# Patient Record
Sex: MALE | Race: WHITE | NOT HISPANIC OR LATINO | Employment: FULL TIME | URBAN - METROPOLITAN AREA
[De-identification: names, ages, dates, MRNs, and addresses within clinical notes are randomized per-mention and may not be internally consistent; named-entity substitution may affect disease eponyms.]

---

## 2019-04-24 ENCOUNTER — OFFICE VISIT (OUTPATIENT)
Dept: UROLOGY | Facility: CLINIC | Age: 54
End: 2019-04-24
Payer: COMMERCIAL

## 2019-04-24 VITALS
WEIGHT: 140 LBS | TEMPERATURE: 99 F | DIASTOLIC BLOOD PRESSURE: 73 MMHG | RESPIRATION RATE: 18 BRPM | HEART RATE: 69 BPM | BODY MASS INDEX: 22.5 KG/M2 | HEIGHT: 66 IN | SYSTOLIC BLOOD PRESSURE: 115 MMHG

## 2019-04-24 DIAGNOSIS — N42.89 ASYMMETRIC PROSTATE: Primary | ICD-10-CM

## 2019-04-24 DIAGNOSIS — R39.11 BENIGN PROSTATIC HYPERPLASIA WITH URINARY HESITANCY: ICD-10-CM

## 2019-04-24 DIAGNOSIS — N40.1 BENIGN PROSTATIC HYPERPLASIA WITH URINARY HESITANCY: ICD-10-CM

## 2019-04-24 DIAGNOSIS — N52.9 ERECTILE DYSFUNCTION, UNSPECIFIED ERECTILE DYSFUNCTION TYPE: ICD-10-CM

## 2019-04-24 LAB
BILIRUB SERPL-MCNC: ABNORMAL MG/DL
BLOOD URINE, POC: ABNORMAL
COLOR, POC UA: YELLOW
GLUCOSE UR QL STRIP: ABNORMAL
KETONES UR QL STRIP: ABNORMAL
LEUKOCYTE ESTERASE URINE, POC: ABNORMAL
NITRITE, POC UA: ABNORMAL
PH, POC UA: 6
POC RESIDUAL URINE VOLUME: 21 ML (ref 0–100)
PROTEIN, POC: 30
SPECIFIC GRAVITY, POC UA: 1.02
UROBILINOGEN, POC UA: 0.2

## 2019-04-24 PROCEDURE — 99999 PR PBB SHADOW E&M-NEW PATIENT-LVL IV: CPT | Mod: PBBFAC,,, | Performed by: NURSE PRACTITIONER

## 2019-04-24 PROCEDURE — 99999 PR PBB SHADOW E&M-NEW PATIENT-LVL IV: ICD-10-PCS | Mod: PBBFAC,,, | Performed by: NURSE PRACTITIONER

## 2019-04-24 PROCEDURE — 51798 US URINE CAPACITY MEASURE: CPT | Mod: S$GLB,,, | Performed by: NURSE PRACTITIONER

## 2019-04-24 PROCEDURE — 81002 URINALYSIS NONAUTO W/O SCOPE: CPT | Mod: S$GLB,,, | Performed by: NURSE PRACTITIONER

## 2019-04-24 PROCEDURE — 99204 OFFICE O/P NEW MOD 45 MIN: CPT | Mod: 25,S$GLB,, | Performed by: NURSE PRACTITIONER

## 2019-04-24 PROCEDURE — 99204 PR OFFICE/OUTPT VISIT, NEW, LEVL IV, 45-59 MIN: ICD-10-PCS | Mod: 25,S$GLB,, | Performed by: NURSE PRACTITIONER

## 2019-04-24 PROCEDURE — 51798 PR MEAS,POST-VOID RES,US,NON-IMAGING: ICD-10-PCS | Mod: S$GLB,,, | Performed by: NURSE PRACTITIONER

## 2019-04-24 PROCEDURE — 81002 POCT URINE DIPSTICK WITHOUT MICROSCOPE: ICD-10-PCS | Mod: S$GLB,,, | Performed by: NURSE PRACTITIONER

## 2019-04-24 RX ORDER — ALFUZOSIN HYDROCHLORIDE 10 MG/1
10 TABLET, EXTENDED RELEASE ORAL
Qty: 30 TABLET | Refills: 12 | Status: SHIPPED | OUTPATIENT
Start: 2019-04-24 | End: 2021-05-03

## 2019-04-24 NOTE — PROGRESS NOTES
"[facility] Note  Staff: SUJATHA HartC    PCP: Jay Dubon    Chief Complaint: Prostate asymmetry, Urinary hesitancy.     Subjective:        HPI: Marcelo Broderick is a 53 y.o. male NEW PT presents with c/o urinary hesitancy, frequency, and straining x one month.  He is here today for further evaluation of symptoms.     History:  Pt has seen Dr.Stephen Rosa in the past ([id number]) for an finding of "asymmetric prostate" which was visualized pt states from GI MD who was doing his colonoscopy at that time and referred him to  for further evaluation.  No procedures were performed in regards to this as stated by pt in office today.    Questions asked the pt during ov today:  Urgency: Sometimes, urge incontinence? None  NTF: 1x night, DTF: None  Dysuria: No  Gross Hematuria:No  Straining:Yes, Hesistancy:Yes, Intermittency:Yes, Weak stream:Yes  Vasectomy: , no complications with that procedure done in [place], [place]  No recent PSA lab work has been performed.    AUA SS Today: 15 with QOL at  "Pleased"  Feeling of ICBE: 1  Frequency:3  Intermittency:4  Urgency:1  Weak urine stream:4  Strainin  Nocturia:1    REVIEW OF SYSTEMS:  A comprehensive 10 system review was performed and is negative except as noted above in HPI    PMHx:  Past Medical History:   Diagnosis Date    Prostate asymmetry      Kidney stones: No  Cataracts? None, wears glasses    PSHx:  Past Surgical History:   Procedure Laterality Date    KNEE ARTHROSCOPY W/ ACL RECONSTRUCTION Right     VASECTOMY       Screening Studies  Colonoscopy: Done in , normal findings.    Fam Hx:   malignancies: No    kidney stones: Yes - Father with hx of stones     Soc Hx:  +tobacco use;1pk per day; Pt began when he was 18 years old  +Alcohol, 4-16 oz of beer daily.  Occupation:    Allergies:  Pcn [penicillins]    Medications: reviewed   Anticoagulation: No    Objective:     Vitals:    19 1505   BP: 115/73 "   Pulse: 69   Resp: 18   Temp: 98.6 °F (37 °C)     General:WDWN in NAD  Eyes: PERRLA, normal conjunctiva  Respiratory: no increased work on breathing, clear to auscultation  Cardiovascular: regular rate and rhythm. No obvious extremity edema.  GI: palpation of masses. No tenderness. No hepatosplenomegaly to palpation.  Musculoskeletal: normal range of motion of bilateral upper extremities. Normal muscle strength and tone.  Skin: no obvious rashes or lesions. No tightening of skin noted.  Neurologic: CN grossly normal. Normal sensation.   Psychiatric: awake, alert and oriented x 3. Mood and affect normal. Cooperative.    :  Inspection of anus and perineum normal  No scrotal rashes, cysts or lesions  Epididymis normal in size, no tenderness  Testes normal and size, equal size bilaterally, no masses  Urethral meatus normal without discharge  Penis is circumcised,    ERICA: +Enlarged and asymmetric prostate gland without masses, tenderness. SV not palpable. Normal sphincter tone. No hemhorroids.  No bilateral inguinal hernias noted     PVR by bladder scan performed by me today:  21 mL    LABS REVIEW:  UA today:  Color:Clear, Yellow  Spec. Grav.  1.020  PH  6.5  30 Protein  Trace of Ketones  Cr:   Lab Results   Component Value Date    CREATININE 1.01 11/16/2016     Assessment:       1. Asymmetric prostate    2. Benign prostatic hyperplasia with urinary hesitancy    3. Erectile dysfunction, unspecified erectile dysfunction type          Plan:   BPH with LUTS, Asymmetric prostate, ED:    1.  We will obtain records from Dr. Sang Rosa's office to review old  records/treatments.  2.  Uroxatral 10 mg one tablet daily to be prescribed to pt in office today for current LUTS.  3.  The following lab work will be performed at [facility]--orders given to pt today.                  CBC, BMP, PSA-Total, Testosterone, Hgb A1C, FSH, LH, Prolactin, TSH    F/u After we receive lab results then we will contact pt to schedule f/up  appt.    [facility]: N/A    Christal Kaur, SUJATHAC

## 2019-04-24 NOTE — PATIENT INSTRUCTIONS
Benign Prostatic Hyperplasia    The prostate is a small gland that makes semen. As you age, the prostate grows. If it becomes too big, it may cause problems with urination. This condition is called benign prostatic hyperplasia (BPH).  Symptoms of BPH  BPH is common in men over age 60. Thats because the prostate grows bigger during a mans life. As it grows, it presses against the urethra. The urethra carries urine out of your body from your bladder through your penis. Your bladder may also weaken as you age. It may not empty completely after you urinate.  Men with BPH may have these symptoms:  · The urge to frequently urinate, especially at night  · Leaking or dribbling of urine  · A weak stream of urine  · Not able to urinate, or having trouble starting to urinate  Diagnosing BPH  BPH can hurt your bladder and kidneys. It can also lead to bladder stones and urinary tract infections. If you think you may have BPH, talk with your healthcare provider. Early treatment can prevent problems.  Several tests can diagnose BPH. These include:  · Digital rectal exam. During this procedure, your provider puts a gloved, greased (lubricated) finger into your rectum to check the size of your prostate.  · Imaging tests. X-rays and other imaging tests can find problems in your kidneys or bladder.  · Cystoscopy. This test uses a flexible tube with a camera (scope) to look inside your urinary tract.  · Urine flow study. This test uses a special device to see how fast urine leaves your body.  Treating BPH  If you have mild symptoms, you may not need treatment. You may be able to control your BPH with lifestyle changes. Some men feel better if they limit or avoid alcohol and caffeinated drinks like coffee. Not drinking too many fluids at night can also help. Increasing your physical activity may ease symptoms, too.  Kegel exercises may also help. They strengthen the pelvic muscle to prevent urine from leaking. While urinating,  contract your pelvic muscle to stop or slow down the flow of urine. Hold for 10 seconds. Repeat at least 5 times. Do the exercise 3 to 5 times each day.  Certain medicines can worsen BPH symptoms. These include medicines for congestion, allergies, and depression. Medicines that increase your urine flow (diuretics) can also worsen BPH symptoms. If you take any of these, talk with your provider. You may need to take another medicine or change how much you take.  BPH symptoms often get worse as the prostate grows. So at some point you may need treatment. Your provider may prescribe medicine to shrink the prostate or stop its growth. Other treatments can make the urethra wider to let urine to flow more easily. There are also some minimally invasive techniques to remove prostate tissue.  If your BPH is severe, your health care provider may recommend surgery. Surgery takes out enlarged parts of the prostate gland. Your health care provider can figure out the best option for you based on your age, overall health, and other factors.  BPH and Prostate Cancer  BPH and prostate cancer share some symptoms. Thats why its important to talk with your provider about your symptoms. Men with BPH arent more likely to develop this cancer. But they may have higher levels of the prostate-specific antigen (PSA). A higher PSA level may also be a sign of prostate cancer. Certain tests help distinguish BPH from prostate cancer. They include prostate ultrasound and biopsy.  Date Last Reviewed: 5/31/2015 © 2000-2017 [employer]. [address] [place] [address]. All rights reserved. This information is not intended as a substitute for professional medical care. Always follow your healthcare professional's instructions.        BPH (Enlarged Prostate)  The prostate is a gland at the base of the bladder. As some men get older, the prostate may get bigger in size. This problem is called benign prostatic hyperplasia  (BPH). BPH puts pressure on the urethra. This is the tube that carries urine from the bladder to the penis. It may interfere with the flow of urine. It may also keep the bladder from emptying fully.    Symptoms of BPH include trouble starting urination and feeling as though the bladder isnt emptying all the way. It also includes a weak urine stream, dribbling and leaking of urine, and frequent and urgent urination (especially at night). BPH can increase the risk of urinary infections. It can also block off urine flow completely. If this occurs, a thin tube (catheter) may be passed into the bladder to help drain urine.  If symptoms are mild, no treatment may be needed right now. If symptoms are more severe, treatment is likely needed. The goal of treatment is to improve urine flow and reduce symptoms. Treatments can include medicine and procedures. Your healthcare provider will discuss treatment options with you as needed.  Home care  The following guidelines will help you care for yourself at home:  · Urinate as soon as you feel the urge. Don't try to hold your urine.  · Don't limit your fluid intake during the day. Drink 6 to 8 glasses of water or liquids a day. This prevents bacteria from building up in the bladder.  · Avoid drinking fluids after dinner to help reduce urination during the night.  · Avoid medicines that can worsen your symptoms. These include certain cold and allergy medicines and antidepressants. Diuretics used for high blood pressure can also worsen symptoms. Talk to your doctor about the medicines you take. Other choices may work better for you.  Prostate cancer screening  BPH does not increase the risk of prostate cancer. But because prostate cancer is a common cancer in men, screening is sometimes recommended. This may help detect the cancer in its early stages when treatment is most effective. Factors that can increase the risk of prostate cancer include being -American or having a  father or brother who had prostate cancer. A high-fat diet may also increase the risk of prostate cancer. Talk to your healthcare provider to see whether you should be screened for prostate cancer.  Follow-up care  Follow up with your healthcare provider, or as advised  To learn more, go to:  · National Kidney & Urologic Diseases Information Clearinghouse  kidney.niddk.nih.gov, 431.265.8653  When to seek medical advice  Call your healthcare provider right away if any of these occur:  · Fever of 100.4°F (38.0°C) or higher, or as advised  · Unable to pass urine for 8 hours  · Increasing pressure or pain in your bladder (lower abdomen)  · Blood in the urine  · Increasing low back pain, not related to injury  · Symptoms of urinary infection (increased urge to urinate, burning when passing urine, foul-smelling urine)  Date Last Reviewed: 7/1/2016  © 8330-4499 [employer]. [address] [place] [address]. All rights reserved. This information is not intended as a substitute for professional medical care. Always follow your healthcare professional's instructions.        Prostate Problems and Related Urinary Symptoms    Many men have problems with the prostate at some time in their lives. The prostate gland is part of the male reproductive system. Its located just below the bladder. The prostate surrounds the urethra (the tube that carries urine and semen out of the body). The main function of the prostate gland is to add fluid to the semen. When problems occur in the prostate, the bladder and urethra are often affected as well. The most common prostate problems are described below.  BPH  BPH (benign prostatic hyperplasia) develops when changing hormone levels cause the prostate to grow larger. This often starts around age 50. Excess tissue can block the urethra, making it harder for urine to flow. The enlarged prostate can also press on the bladder, so you may need to urinate more often. Other  symptoms include straining during urination, a weak urine stream, urinating more at night, incontinence, dribbling at the end of urination, and feeling that the bladder isnt emptying all the way. Note that BPH is not cancer and does not cause cancer.  How BPH affects the bladder  Pushing to urinate through a narrowed urethra can cause the bladder walls to thicken or stretch out of shape. A stretched bladder may have problems emptying all the way. If urine stays in the bladder longer than it should, you may develop an infection or bladder stones. Also, the kidneys cant drain properly into a bladder that doesnt empty completely. This can lead to kidney failure. Pressure from urine buildup can also cause leaking of urine (called overflow incontinence).  Other prostate problems  · Prostatitis is an infection or inflammation that causes the prostate to become painful and swollen. The swelling narrows the urethra and can block the bladder neck. Prostatitis can cause a burning sensation during urination. You may also feel pressure or pain in the genital area. In some cases, prostatitis can cause fever and chills, and can make you very sick.  · Cancer occurs when abnormal cells form a tumor (a lump of cells that grow uncontrolled). Some tumors can be felt during a physical exam, others cant. Prostate cancer often causes no symptoms at all, especially in its early stages. Prostate symptoms are more likely to be caused by a problem that is NOT cancer.   Date Last Reviewed: 1/1/2017  © 0418-2806 [employer]. [address] [place] [address], [place] [address]. All rights reserved. This information is not intended as a substitute for professional medical care. Always follow your healthcare professional's instructions.

## 2019-04-29 ENCOUNTER — TELEPHONE (OUTPATIENT)
Dept: UROLOGY | Facility: CLINIC | Age: 54
End: 2019-04-29

## 2019-04-29 ENCOUNTER — DOCUMENTATION ONLY (OUTPATIENT)
Dept: UROLOGY | Facility: CLINIC | Age: 54
End: 2019-04-29

## 2019-04-29 NOTE — TELEPHONE ENCOUNTER
Called patient to inform him that lab results were all wnl, patient voiced understanding and is scheduled to follow up in 5 weeks.

## 2019-04-29 NOTE — TELEPHONE ENCOUNTER
Please call this patient and notify him of his lab results:    Inform the Patient the PSA level is 0.5 WNL  All other lab work including glucose, hormone levels, testosterone showed normal findings.    *Therefore schedule this pt to see me back in office for routine recheck of LUTS in 4-6 weeks.  Thanks.

## 2019-04-29 NOTE — PROGRESS NOTES
LABS DONE AT [facility]  04/25/19 AT 07:47 A.M.    PSA, TOTAL-0.5    TESTOSTERONE, TOTAL-671    FSH-6.7  LH-3.0  PROLACTIN LEVEL-4.2    CBC-All showed normal findings.    BMP-All showed normal findings.      BUN/Cr:  15/0.94      GFR of 92    HGB A1C-5.5    TSH-2.16

## 2019-06-04 ENCOUNTER — OFFICE VISIT (OUTPATIENT)
Dept: UROLOGY | Facility: CLINIC | Age: 54
End: 2019-06-04
Payer: COMMERCIAL

## 2019-06-04 VITALS
WEIGHT: 142.19 LBS | HEART RATE: 51 BPM | RESPIRATION RATE: 18 BRPM | DIASTOLIC BLOOD PRESSURE: 67 MMHG | HEIGHT: 66 IN | SYSTOLIC BLOOD PRESSURE: 114 MMHG | TEMPERATURE: 98 F | BODY MASS INDEX: 22.85 KG/M2

## 2019-06-04 DIAGNOSIS — R35.0 BENIGN PROSTATIC HYPERPLASIA WITH URINARY FREQUENCY: Primary | ICD-10-CM

## 2019-06-04 DIAGNOSIS — N40.1 BENIGN PROSTATIC HYPERPLASIA WITH URINARY FREQUENCY: Primary | ICD-10-CM

## 2019-06-04 LAB
BILIRUB SERPL-MCNC: ABNORMAL MG/DL
BLOOD URINE, POC: ABNORMAL
COLOR, POC UA: YELLOW
GLUCOSE UR QL STRIP: ABNORMAL
KETONES UR QL STRIP: ABNORMAL
LEUKOCYTE ESTERASE URINE, POC: ABNORMAL
NITRITE, POC UA: ABNORMAL
PH, POC UA: 6
PROTEIN, POC: ABNORMAL
SPECIFIC GRAVITY, POC UA: 1.01
UROBILINOGEN, POC UA: 0.2

## 2019-06-04 PROCEDURE — 99213 OFFICE O/P EST LOW 20 MIN: CPT | Mod: 25,S$GLB,, | Performed by: NURSE PRACTITIONER

## 2019-06-04 PROCEDURE — 99999 PR PBB SHADOW E&M-EST. PATIENT-LVL III: CPT | Mod: PBBFAC,,, | Performed by: NURSE PRACTITIONER

## 2019-06-04 PROCEDURE — 99213 PR OFFICE/OUTPT VISIT, EST, LEVL III, 20-29 MIN: ICD-10-PCS | Mod: 25,S$GLB,, | Performed by: NURSE PRACTITIONER

## 2019-06-04 PROCEDURE — 81002 POCT URINE DIPSTICK WITHOUT MICROSCOPE: ICD-10-PCS | Mod: S$GLB,,, | Performed by: NURSE PRACTITIONER

## 2019-06-04 PROCEDURE — 99999 PR PBB SHADOW E&M-EST. PATIENT-LVL III: ICD-10-PCS | Mod: PBBFAC,,, | Performed by: NURSE PRACTITIONER

## 2019-06-04 PROCEDURE — 81002 URINALYSIS NONAUTO W/O SCOPE: CPT | Mod: S$GLB,,, | Performed by: NURSE PRACTITIONER

## 2019-06-04 NOTE — PROGRESS NOTES
"[facility] Note  Staff: SUJATHA HartC    PCP: Jay Dubon    Chief Complaint: Follow-up: BPH w/ LUTS    Subjective:        HPI: Marcelo Broderick is a 53 y.o. male presents today for routine recheck of symptoms.  He has hx of BPH with LUTS.    The pt was initially seen by me on 4/24/19 with c/o urinary hesitancy, frequency, and straining x one month.  After last ov, pt was started on Uroxatral 10 mg daily for his LUTS.  TODAY, pt states today his LUTS have improved since starting the med.      History:  Pt has seen Dr.Stephen Rosa in the past ([id number]) for an finding of "asymmetric prostate" which was visualized pt states from GI MD who was doing his colonoscopy at that time and referred him to  for further evaluation.  No procedures were performed in regards to this as stated by pt in office today.     Questions asked the pt during (4/24/19) ov:  Urgency: Sometimes, urge incontinence? None  NTF: 1x night, DTF: None  Dysuria: No  Gross Hematuria:No  Straining:Yes, Hesistancy:Yes, Intermittency:Yes, Weak stream:Yes  Vasectomy: 1992, no complications with that procedure done in [place], [place]  No recent PSA lab work has been performed.    LABS DONE AT [facility]  04/25/19 AT 07:47 A.M.   **PSA, TOTAL-0.5     TESTOSTERONE, TOTAL-671     FSH-6.7  LH-3.0  PROLACTIN LEVEL-4.2     CBC-All showed normal findings.     BMP-All showed normal findings.      BUN/Cr:  15/0.94      GFR of 92     HGB A1C-5.5     TSH-2.16     REVIEW OF SYSTEMS:  A comprehensive 10 system review was performed and is negative except as noted above in HPI    PMHx:  Past Medical History:   Diagnosis Date    Prostate asymmetry      Kidney stones: No  Cataracts? None, wears glasses    PSHx:  Past Surgical History:   Procedure Laterality Date    KNEE ARTHROSCOPY W/ ACL RECONSTRUCTION Right     VASECTOMY       Allergies:  Pcn [penicillins]    Medications: reviewed   Anticoagulation: No    Objective:     Vitals: "    06/04/19 1523   BP: 114/67   Pulse: (!) 51   Resp: 18   Temp: 98.2 °F (36.8 °C)     General:WDWN in NAD  Eyes: PERRLA, normal conjunctiva  Respiratory: no increased work on breathing, clear to auscultation  Cardiovascular: regular rate and rhythm. No obvious extremity edema.  GI: palpation of masses. No tenderness. No hepatosplenomegaly to palpation.  Musculoskeletal: normal range of motion of bilateral upper extremities. Normal muscle strength and tone.  Skin: no obvious rashes or lesions. No tightening of skin noted.  Neurologic: CN grossly normal. Normal sensation.   Psychiatric: awake, alert and oriented x 3. Mood and affect normal. Cooperative.    LABS REVIEW:  UA today:  Color:Clear, Yellow  Spec. Grav.  1.015  PH  6.0  Trace of Protein    Cr:   Lab Results   Component Value Date    CREATININE 1.01 11/16/2016     Assessment:       1. Benign prostatic hyperplasia with urinary frequency          Plan:     -Continue Uroxatral 10 mg daily as previously instructed.  -(Pt may try and discontinue med in 6 mos to see how his LUTS are at that time)    F/u with me in one year    [facility]: Pending    Christal Kaur, LORENZA

## 2019-06-10 ENCOUNTER — HOSPITAL ENCOUNTER (EMERGENCY)
Facility: HOSPITAL | Age: 54
Discharge: HOME | End: 2019-06-10
Attending: EMERGENCY MEDICINE
Payer: COMMERCIAL

## 2019-06-10 VITALS
WEIGHT: 142 LBS | OXYGEN SATURATION: 97 % | BODY MASS INDEX: 22.82 KG/M2 | SYSTOLIC BLOOD PRESSURE: 116 MMHG | TEMPERATURE: 98 F | DIASTOLIC BLOOD PRESSURE: 66 MMHG | HEIGHT: 66 IN | RESPIRATION RATE: 18 BRPM | HEART RATE: 61 BPM

## 2019-06-10 DIAGNOSIS — H60.02 ABSCESS OF EXTERNAL EAR, LEFT: Primary | ICD-10-CM

## 2019-06-10 PROCEDURE — 25000003 PHARM REV CODE 250: Performed by: EMERGENCY MEDICINE

## 2019-06-10 PROCEDURE — 10061 I&D ABSCESS COMP/MULTIPLE: CPT

## 2019-06-10 PROCEDURE — 99283 EMERGENCY DEPT VISIT LOW MDM: CPT | Mod: 25

## 2019-06-10 PROCEDURE — 87070 CULTURE OTHR SPECIMN AEROBIC: CPT

## 2019-06-10 RX ORDER — MUPIROCIN 20 MG/G
OINTMENT TOPICAL 3 TIMES DAILY
Qty: 22 G | Refills: 0 | Status: SHIPPED | OUTPATIENT
Start: 2019-06-10 | End: 2019-06-17

## 2019-06-10 RX ORDER — LIDOCAINE HYDROCHLORIDE 10 MG/ML
10 INJECTION INFILTRATION; PERINEURAL
Status: COMPLETED | OUTPATIENT
Start: 2019-06-10 | End: 2019-06-10

## 2019-06-10 RX ORDER — SULFAMETHOXAZOLE AND TRIMETHOPRIM 800; 160 MG/1; MG/1
1 TABLET ORAL
COMMUNITY
End: 2019-07-05

## 2019-06-10 RX ORDER — LIDOCAINE HYDROCHLORIDE 10 MG/ML
INJECTION INFILTRATION; PERINEURAL
Status: DISCONTINUED
Start: 2019-06-10 | End: 2019-06-10 | Stop reason: HOSPADM

## 2019-06-10 RX ADMIN — LIDOCAINE HYDROCHLORIDE 10 ML: 10 INJECTION, SOLUTION INFILTRATION; PERINEURAL at 05:06

## 2019-06-10 NOTE — ED PROVIDER NOTES
"Encounter Date: 6/10/2019    SCRIBE #1 NOTE: I, Seble Iglesias, am scribing for, and in the presence of, Dr. Tyler Garces.       History     Chief Complaint   Patient presents with    Cellulitis     behind left ear / started antibiotics       Time seen by provider: 5:47 PM on 06/10/2019    Marcelo Broderick is a 53 y.o. male who presents the ED with complaints of "cellulitis" behind his left ear. The patient reports that he initially noticed a little white head and cut it open. He started taking antibiotics after the wound became infected x3 days ago without relief and decided to come into the ED today. The patient denies any fevers. PMHx of prostate asymmetry. SHx includes knee arthroscopy with ACL reconstruction and vasectomy. Penicillin allergy noted.    The history is provided by the patient.     Review of patient's allergies indicates:   Allergen Reactions    Pcn [penicillins]      Past Medical History:   Diagnosis Date    Prostate asymmetry      Past Surgical History:   Procedure Laterality Date    KNEE ARTHROSCOPY W/ ACL RECONSTRUCTION Right     VASECTOMY       Family History   Problem Relation Age of Onset    Cancer Mother         colon    Hypertension Mother     Heart disease Mother     Atrial fibrillation Mother     Emphysema Father     COPD Father     Hyperlipidemia Brother      Social History     Tobacco Use    Smoking status: Current Every Day Smoker   Substance Use Topics    Alcohol use: Yes    Drug use: No     Review of Systems   Constitutional: Negative for activity change, appetite change, chills, fatigue and fever.   Respiratory: Negative for apnea and shortness of breath.    Cardiovascular: Negative for chest pain and palpitations.   Gastrointestinal: Negative for abdominal distention and abdominal pain.   Genitourinary: Negative for difficulty urinating.   Musculoskeletal: Negative for neck pain.   Skin: Positive for wound (behind left ear). Negative for pallor and rash.   Neurological: " Negative for headaches.   Hematological: Does not bruise/bleed easily.   Psychiatric/Behavioral: Negative for agitation.       Physical Exam     Initial Vitals [06/10/19 1709]   BP Pulse Resp Temp SpO2   116/66 61 18 98.1 °F (36.7 °C) 97 %      MAP       --         Physical Exam    Nursing note and vitals reviewed.  Constitutional: He appears well-developed and well-nourished. No distress.   HENT:   Head: Normocephalic and atraumatic.   Eyes: Conjunctivae and EOM are normal. Pupils are equal, round, and reactive to light.   Neck: Neck supple.   Cardiovascular: Normal rate, regular rhythm and normal heart sounds. Exam reveals no gallop and no friction rub.    No murmur heard.  Pulmonary/Chest: Breath sounds normal. No respiratory distress. He has no wheezes. He has no rhonchi. He has no rales.   Abdominal: Soft. Bowel sounds are normal. He exhibits no distension. There is no tenderness.   Musculoskeletal: Normal range of motion. He exhibits no edema or tenderness.   Neurological: He is alert and oriented to person, place, and time.   Skin: Skin is warm and dry.   Erythematous, warm, red, and fluctuant tender nodule behind oracle of left ear.   Psychiatric: He has a normal mood and affect.         ED Course   I & D - Incision and Drainage  Date/Time: 6/12/2019 7:01 AM  Performed by: Eloisa Velazquez NP  Authorized by: Tyler Garces MD   Consent Done: Not Needed  Type: abscess  Body area: head/neck  Location details: left external ear  Anesthesia: local infiltration    Anesthesia:  Local Anesthetic: lidocaine 1% without epinephrine  Anesthetic total: 3 mL  Description of findings: Infected abscess posterior to the left auricle   Risk factor: underlying major nerve  Scalpel size: 11  Incision type: single straight  Complexity: complex  Drainage: pus  Drainage amount: moderate  Wound treatment: incision,  wound left open,  expression of material and  drainage  Packing material: 1/4 in gauze  Patient tolerance: Patient  tolerated the procedure well with no immediate complications        Labs Reviewed   CULTURE, AEROBIC  (SPECIFY SOURCE)          Imaging Results    None          Medical Decision Making:   History:   Old Medical Records: I decided to obtain old medical records.  Abscess was drained by nurse practitioner.  Patient will continue antibiotics.  Stable for discharge at this time.  Culture ordered.  No signs of deep space infection            Scribe Attestation:   Scribe #1: I performed the above scribed service and the documentation accurately describes the services I performed. I attest to the accuracy of the note.    I, Dr. Tyler Garces personally performed the services described in this documentation. All medical record entries made by the scribe were at my direction and in my presence.  I have reviewed the chart and agree that the record reflects my personal performance and is accurate and complete. Tyler Garces MD.  7:02 AM 06/12/2019    DISCLAIMER: This note was prepared with Dragon NaturallySpeaking voice recognition transcription software. Garbled syntax, mangled pronouns, and other bizarre constructions may be attributed to that software system            Clinical Impression:       ICD-10-CM ICD-9-CM   1. Abscess of external ear, left H60.02 380.10         Disposition:   Disposition: Discharged  Condition: Stable                        Tyler Garces MD  06/12/19 0703

## 2019-06-13 LAB — BACTERIA SPEC AEROBE CULT: NORMAL

## 2021-05-10 ENCOUNTER — PATIENT MESSAGE (OUTPATIENT)
Dept: RESEARCH | Facility: HOSPITAL | Age: 56
End: 2021-05-10

## 2024-04-02 ENCOUNTER — OFFICE VISIT (OUTPATIENT)
Dept: CARDIOLOGY | Facility: CLINIC | Age: 59
End: 2024-04-02
Payer: COMMERCIAL

## 2024-04-02 VITALS
DIASTOLIC BLOOD PRESSURE: 68 MMHG | HEART RATE: 48 BPM | OXYGEN SATURATION: 97 % | SYSTOLIC BLOOD PRESSURE: 124 MMHG | WEIGHT: 137 LBS | BODY MASS INDEX: 22.02 KG/M2 | HEIGHT: 66 IN | RESPIRATION RATE: 16 BRPM

## 2024-04-02 DIAGNOSIS — R00.2 PALPITATIONS: ICD-10-CM

## 2024-04-02 DIAGNOSIS — Z72.0 TOBACCO USE: Primary | ICD-10-CM

## 2024-04-02 DIAGNOSIS — R07.9 CHEST PAIN, UNSPECIFIED TYPE: ICD-10-CM

## 2024-04-02 DIAGNOSIS — R42 DIZZINESS: ICD-10-CM

## 2024-04-02 PROCEDURE — 99204 OFFICE O/P NEW MOD 45 MIN: CPT | Mod: S$GLB,,, | Performed by: INTERNAL MEDICINE

## 2024-04-02 PROCEDURE — 3074F SYST BP LT 130 MM HG: CPT | Mod: CPTII,S$GLB,, | Performed by: INTERNAL MEDICINE

## 2024-04-02 PROCEDURE — 1159F MED LIST DOCD IN RCRD: CPT | Mod: CPTII,S$GLB,, | Performed by: INTERNAL MEDICINE

## 2024-04-02 PROCEDURE — 1160F RVW MEDS BY RX/DR IN RCRD: CPT | Mod: CPTII,S$GLB,, | Performed by: INTERNAL MEDICINE

## 2024-04-02 PROCEDURE — 99999 PR PBB SHADOW E&M-EST. PATIENT-LVL IV: CPT | Mod: PBBFAC,,, | Performed by: INTERNAL MEDICINE

## 2024-04-02 PROCEDURE — 93000 ELECTROCARDIOGRAM COMPLETE: CPT | Mod: S$GLB,,, | Performed by: INTERNAL MEDICINE

## 2024-04-02 PROCEDURE — 3078F DIAST BP <80 MM HG: CPT | Mod: CPTII,S$GLB,, | Performed by: INTERNAL MEDICINE

## 2024-04-02 PROCEDURE — 3008F BODY MASS INDEX DOCD: CPT | Mod: CPTII,S$GLB,, | Performed by: INTERNAL MEDICINE

## 2024-04-02 RX ORDER — LANOLIN ALCOHOL/MO/W.PET/CERES
400 CREAM (GRAM) TOPICAL DAILY
Qty: 90 TABLET | Refills: 3 | Status: SHIPPED | OUTPATIENT
Start: 2024-04-02 | End: 2025-04-02

## 2024-04-02 NOTE — ASSESSMENT & PLAN NOTE
As above will continue cardiac workup etiology appears unclear.  He may have some labyrinth dysfunction associated with this with extensive tobacco usage and chronic congestion.  However cardiac dysrhythmias need to be ruled out event recorder as discussed.  
Continues use tobacco encouraged him to cut back and use tobacco constant for complete cessation.  
He was atypical chest pains appear to be musculoskeletal etiology will obtain regular stress test if this would identify further ischemic etiology if this is abnormal he may need nuclear perfusion imaging study.  
Recurrent episodes of palpitations recommend to obtain magnesium levels and of note TSH is also normal.  He appears to be relatively stable from cardiac standpoint   recommend to obtain  1. Echocardiogram for LV function assessment chamber size evaluation.    2. Event recorder for detection of sustained arrhythmias marked bradycardia.    3. Also obtain a symptom limited exercise stress test to evaluate for chronotropic response to exercise    4. Recommend to add magnesium oxide 400 mg daily to his regimen  
IMPROVE-DD Application Not Available

## 2024-04-02 NOTE — PROGRESS NOTES
Subjective:    Patient ID:  Marcelo Broderick is a 58 y.o. male patient here for evaluation Establish Care (Sunday he started having palpitations and some dizziness. + smoker)      History of Present Illness:     Patient is a 58-year-old gentleman who has been having intermittent episodes of palpitations and occasional episodes of dizziness are is seeking follow-up evaluation.  He was primary care apparently and encouraged her to seek cardiac evaluation.    Patient was noted to have bradycardia with a heart rate of 44 beats per minute.  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    There is no radiation of pain to the neck or jaw posteriorly.  No significant shortness of breath noted.  At times when in maturation he also has episodes of dizziness.  He had an episode of syncope in 2015 transiently had workup at the [facility] following that.  No nausea no blood in the stools or black stools  He smokes pack of cigarettes for more on a regular basis.  And he takes 3 cans of 16 oz beers a day.    Review of patient's allergies indicates:   Allergen Reactions    Pcn [penicillins]        Past Medical History:   Diagnosis Date    Prostate asymmetry      Past Surgical History:   Procedure Laterality Date    COLONOSCOPY  08/13/2019    Dr Bray   received report     KNEE ARTHROSCOPY W/ ACL RECONSTRUCTION Right     VASECTOMY       Social History     Tobacco Use    Smoking status: Every Day     Current packs/day: 1.00     Types: Cigarettes    Smokeless tobacco: Never   Substance Use Topics    Alcohol use: Yes     Alcohol/week: 28.0 standard drinks of alcohol     Types: 28 Cans of beer per week    Drug use: No        Review of Systems   As noted in HPI in addition     Constitutional: Negative for chills, fatigue and fever.   Eyes: No double vision, No blurred vision  Neuro: No headaches, episodes of dizziness noted    Respiratory: Negative for cough, shortness of breath and wheezing.    Cardiovascular: Negative for chest pain. Negative for palpitations and leg swelling.   Gastrointestinal: Negative for abdominal pain, No melena, diarrhea, nausea and vomiting.   Genitourinary: Negative for dysuria and frequency, Negative for hematuria  Skin: Negative for bruising, Negative for edema or discoloration noted.   Endocrine: Negative for polyphagia, Negative for heat intolerance, Negative for cold intolerance  Psychiatric: Negative for depression, Negative for anxiety, Negative for memory loss  Musculoskeletal: Negative for neck pain, Negative for muscle weakness, Negative for back pain          Objective        Vitals:    04/02/24 1109   BP: 124/68   Pulse: (!) 48   Resp: 16       LIPIDS - LAST 2   Lab Results   Component Value Date    CHOL 216 (H) 03/28/2024    CHOL 213 (H) 07/14/2022    HDL 58 03/28/2024    HDL 58 07/14/2022    LDLCALC 139.2 03/28/2024    LDLCALC 141.0 07/14/2022    TRIG 94 03/28/2024    TRIG 70 07/14/2022    CHOLHDL 26.9 03/28/2024    CHOLHDL 27.2 07/14/2022       CBC - LAST 2  Lab Results   Component Value Date    WBC 5.86 03/28/2024    WBC 5.41 07/14/2022    RBC 4.80 03/28/2024    RBC 4.63 07/14/2022    HGB 15.2 03/28/2024    HGB 15.0 07/14/2022    HCT 44.5 03/28/2024    HCT 42.8 07/14/2022    MCV 93 03/28/2024    MCV 92 07/14/2022    MCH 31.7 (H) 03/28/2024    MCH 32.4 (H) 07/14/2022    MCHC 34.2 03/28/2024    MCHC 35.0 07/14/2022    RDW 13.6 03/28/2024    RDW 13.7 07/14/2022     03/28/2024     07/14/2022    MPV 10.4 03/28/2024    MPV 10.2 07/14/2022    GRAN 2.6 03/28/2024    GRAN 43.9 03/28/2024    LYMPH 2.4 03/28/2024    LYMPH 40.6 03/28/2024    MONO 0.6 03/28/2024    MONO 9.7 03/28/2024    BASO 0.07 03/28/2024    BASO 0.07 07/14/2022    NRBC 0 03/28/2024    NRBC 0 07/14/2022       CHEMISTRY & LIVER FUNCTION - LAST 2  Lab Results   Component Value Date     03/28/2024     07/14/2022     "K 4.4 03/28/2024    K 4.1 07/14/2022     03/28/2024     07/14/2022    CO2 27 03/28/2024    CO2 27 07/14/2022    ANIONGAP 3 (L) 03/28/2024    ANIONGAP 3 (L) 07/14/2022    BUN 15 03/28/2024    BUN 13 07/14/2022    CREATININE 0.88 03/28/2024    CREATININE 0.86 07/14/2022    GLU 88 03/28/2024    GLU 95 07/14/2022    CALCIUM 9.9 03/28/2024    CALCIUM 9.2 07/14/2022    ALBUMIN 4.3 03/28/2024    ALBUMIN 4.3 07/14/2022    PROT 7.4 03/28/2024    PROT 6.9 07/14/2022    ALKPHOS 64 03/28/2024    ALKPHOS 68 07/14/2022    ALT 16 03/28/2024    ALT 18 07/14/2022    AST 25 03/28/2024    AST 28 07/14/2022    BILITOT 0.6 03/28/2024    BILITOT 0.5 07/14/2022        CARDIAC PROFILE - LAST 2  No results found for: "BNP", "CPK", "CPKMB", "LDH", "TROPONINI", "TROPONINIHS"     COAGULATION - LAST 2  No results found for: "LABPT", "INR", "APTT"    ENDOCRINE & PSA - LAST 2  Lab Results   Component Value Date    HGBA1C 5.4 07/14/2022    HGBA1C 5.4 05/04/2021    TSH 1.190 03/28/2024    TSH 1.010 07/14/2022    PSA 0.61 03/28/2024    PSA 0.47 07/14/2022        ECHOCARDIOGRAM RESULTS  No results found for this or any previous visit.      CURRENT/PREVIOUS VISIT EKG  No results found for this or any previous visit.  No valid procedures specified.   No results found for this or any previous visit.    No valid procedures specified.          PREVIOUS STRESS TEST              PREVIOUS ANGIOGRAM        PHYSICAL EXAM    GENERAL: well built, well nourished, well-developed in no apparent distress alert and oriented.   HEENT: Normocephalic. Pupils normal and conjunctivae normal.  Mucous membranes normal, no cyanosis or icterus, trachea central,no pallor or icterus is noted..   NECK: No JVD. No bruit..   THYROID: Thyroid not enlarged. No nodules present..   CARDIAC: Regular rate and rhythm. S1 is normal.S2 is normal.No gallops, clicks or murmurs noted at this time.  CHEST ANATOMY: normal.   LUNGS: Clear to auscultation. No wheezing or rhonchi.. "   ABDOMEN: Soft no masses or organomegaly.  No abdomen pulsations or bruits.  Normal bowel sounds. No pulsations and no masses felt, No guarding or rebound.   EXTREMITIES: No cyanosis, clubbing or edema noted at this time., no calf tenderness bilaterally.   PERIPHERAL VASCULAR SYSTEM: Good palpable distal pulses.   CENTRAL NERVOUS SYSTEM: No focal motor or sensory deficits noted.   SKIN: Skin without lesions, moist, well perfused.   MUSCLE STRENGTH & TONE: No noteable weakness, atrophy or abnormal movement.     I HAVE REVIEWED :    The vital signs, nurses notes, and all the pertinent radiology and labs.    04/02/2024 EKG shows marked sinus bradycardia with no acute changes.    Current Outpatient Medications   Medication Instructions    albuterol (VENTOLIN HFA) 90 mcg/actuation inhaler 2 puffs, Inhalation, Every 6 hours PRN, Rescue    fluticasone propion-salmeterol 45-21 mcg/dose (ADVAIR HFA) 45-21 mcg/actuation HFAA inhaler 2 puffs, Inhalation, Every 12 hours, Controller          Assessment & Plan     Palpitations  Recurrent episodes of palpitations recommend to obtain magnesium levels and TSH.  He appears to be relatively stable from cardiac standpoint   recommend to obtain  1. Echocardiogram for LV function assessment chamber size evaluation.    2. Event recorder for detection of sustained arrhythmias marked bradycardia.    3. Also obtain a symptom limited exercise stress test to evaluate for chronotropic response to exercise      Dizziness  As above will continue cardiac workup etiology appears unclear.  He may have some labyrinth dysfunction associated with this with extensive tobacco usage and chronic congestion.  However cardiac dysrhythmias need to be ruled out event recorder as discussed.    Tobacco use  Continues use tobacco encouraged him to cut back and use tobacco constant for complete cessation.    Chest pain  He was atypical chest pains appear to be musculoskeletal etiology will obtain regular stress  test if this would identify further ischemic etiology if this is abnormal he may need nuclear perfusion imaging study.          Follow up in about 6 weeks (around 5/14/2024).

## 2024-04-05 ENCOUNTER — HOSPITAL ENCOUNTER (OUTPATIENT)
Dept: CARDIOLOGY | Facility: CLINIC | Age: 59
Discharge: HOME | End: 2024-04-05
Attending: INTERNAL MEDICINE
Payer: COMMERCIAL

## 2024-04-05 DIAGNOSIS — R07.9 CHEST PAIN, UNSPECIFIED TYPE: ICD-10-CM

## 2024-04-05 DIAGNOSIS — R42 DIZZINESS: ICD-10-CM

## 2024-04-05 PROCEDURE — 93242 EXT ECG>48HR<7D RECORDING: CPT | Mod: ,,, | Performed by: INTERNAL MEDICINE

## 2024-04-05 PROCEDURE — 93244 EXT ECG>48HR<7D REV&INTERPJ: CPT | Mod: ,,, | Performed by: INTERNAL MEDICINE

## 2024-04-19 LAB
OHS CV EVENT MONITOR DAY: 6
OHS CV HOLTER HOOKUP DATE: NORMAL
OHS CV HOLTER HOOKUP TIME: NORMAL
OHS CV HOLTER LENGTH DECIMAL HOURS: 145
OHS CV HOLTER LENGTH HOURS: 1
OHS CV HOLTER LENGTH MINUTES: 0
OHS CV HOLTER SCAN DATE: NORMAL
OHS CV HOLTER SINUS AVERAGE HR: 65 BPM
OHS CV HOLTER SINUS MAX HR: 124 BPM
OHS CV HOLTER SINUS MIN HR: 38 BPM
OHS CV HOLTER STUDY END DATE: NORMAL
OHS CV HOLTER STUDY END TIME: NORMAL

## 2024-05-21 LAB
OHS QRS DURATION: 94 MS
OHS QTC CALCULATION: 374 MS

## 2024-08-16 ENCOUNTER — OFFICE VISIT (OUTPATIENT)
Dept: UROLOGY | Facility: CLINIC | Age: 59
End: 2024-08-16
Payer: COMMERCIAL

## 2024-08-16 VITALS
HEART RATE: 60 BPM | BODY MASS INDEX: 22.18 KG/M2 | DIASTOLIC BLOOD PRESSURE: 70 MMHG | HEIGHT: 66 IN | WEIGHT: 138 LBS | SYSTOLIC BLOOD PRESSURE: 108 MMHG

## 2024-08-16 DIAGNOSIS — Z12.5 ENCOUNTER FOR PROSTATE CANCER SCREENING: Primary | ICD-10-CM

## 2024-08-16 LAB
BILIRUBIN, UA POC OHS: NEGATIVE
BLOOD, UA POC OHS: NEGATIVE
CLARITY, UA POC OHS: CLEAR
COLOR, UA POC OHS: YELLOW
GLUCOSE, UA POC OHS: NEGATIVE
KETONES, UA POC OHS: NEGATIVE
LEUKOCYTES, UA POC OHS: NEGATIVE
NITRITE, UA POC OHS: NEGATIVE
PH, UA POC OHS: 6
POC RESIDUAL URINE VOLUME: 14 ML (ref 0–100)
PROTEIN, UA POC OHS: NEGATIVE
SPECIFIC GRAVITY, UA POC OHS: <=1.005
UROBILINOGEN, UA POC OHS: 0.2

## 2024-08-16 PROCEDURE — 99999 PR PBB SHADOW E&M-EST. PATIENT-LVL III: CPT | Mod: PBBFAC,,, | Performed by: NURSE PRACTITIONER

## 2024-08-16 PROCEDURE — 3078F DIAST BP <80 MM HG: CPT | Mod: CPTII,S$GLB,, | Performed by: NURSE PRACTITIONER

## 2024-08-16 PROCEDURE — 99214 OFFICE O/P EST MOD 30 MIN: CPT | Mod: S$GLB,,, | Performed by: NURSE PRACTITIONER

## 2024-08-16 PROCEDURE — 3074F SYST BP LT 130 MM HG: CPT | Mod: CPTII,S$GLB,, | Performed by: NURSE PRACTITIONER

## 2024-08-16 PROCEDURE — 51798 US URINE CAPACITY MEASURE: CPT | Mod: S$GLB,,, | Performed by: NURSE PRACTITIONER

## 2024-08-16 PROCEDURE — 1159F MED LIST DOCD IN RCRD: CPT | Mod: CPTII,S$GLB,, | Performed by: NURSE PRACTITIONER

## 2024-08-16 PROCEDURE — 1160F RVW MEDS BY RX/DR IN RCRD: CPT | Mod: CPTII,S$GLB,, | Performed by: NURSE PRACTITIONER

## 2024-08-16 PROCEDURE — 81003 URINALYSIS AUTO W/O SCOPE: CPT | Mod: QW,S$GLB,, | Performed by: NURSE PRACTITIONER

## 2024-08-16 PROCEDURE — 3008F BODY MASS INDEX DOCD: CPT | Mod: CPTII,S$GLB,, | Performed by: NURSE PRACTITIONER

## 2024-08-16 RX ORDER — ALFUZOSIN HYDROCHLORIDE 10 MG/1
10 TABLET, EXTENDED RELEASE ORAL
Qty: 30 TABLET | Refills: 10 | Status: SHIPPED | OUTPATIENT
Start: 2024-08-16 | End: 2024-09-15

## 2024-08-16 NOTE — PROGRESS NOTES
[facility] Note  Staff: SUJATHA HartC    PCP: LUCIA Redmond    Chief Complaint: Annual prostate     Subjective:        HPI: Marcelo Broderick is a 58 y.o. male NEW PT presents today for evaluation of annual prostate exam at this time.      24:  Pt arrives today with c/o increased frequency of urination and worsening weak stream.  UA in office today showed normal findings at this time.  PVR is 14 mL  No gross hematuria noted by pt today.  No dysuria at this time.  Pt states today he recently ingested something within the last day that is causing him to have diarrhea as of today.    Last PSA Screening:   Lab Results   Component Value Date    PSA 0.61 2024    PSA 0.47 2022    PSA 0.52 2021     AUA SS Today:    Feeling of ICBE: 4  Frequency: 3  Intermittency:3  Urgency:2  Weak urine stream:4  Strainin  Nocturia:1    REVIEW OF SYSTEMS:  A comprehensive 10 system review was performed and is negative except as noted above in HPI    PMHx:  Past Medical History:   Diagnosis Date    Prostate asymmetry      PSHx:  Past Surgical History:   Procedure Laterality Date    COLONOSCOPY  2019    Dr Bray   received report     KNEE ARTHROSCOPY W/ ACL RECONSTRUCTION Right     VASECTOMY       Allergies:  Pcn [penicillins]    Medications: reviewed     Objective:     Vitals:    24 0809   BP: 108/70   Pulse: 60     General:WDWN in NAD  Eyes: PERRLA, normal conjunctiva  Respiratory: no increased work on breathing, clear to auscultation  Cardiovascular: regular rate and rhythm. No obvious extremity edema.  GI: palpation of masses. No tenderness. No hepatosplenomegaly to palpation.  Musculoskeletal: normal range of motion of bilateral upper extremities. Normal muscle strength and tone.  Skin: no obvious rashes or lesions. No tightening of skin noted.  Neurologic: CN grossly normal. Normal sensation.   Psychiatric: awake, alert and oriented x 3. Mood and affect normal.  "Cooperative.     Exam performed by me during OV today:  Pt Deferred-currently having ongoing diarrhea issues from ingestion food/drink yesterday.    Assessment:       1. Encounter for prostate cancer screening          Plan:     PSA, Total and Free to be scheduled for annual f/up at this time.  Pt will restart alfuzosin 10 mg daily for LUTS at this time.  The med was prescribed to pt today as trial to see if med improves pt's current LUTS.  Benefits, risks and side affects were thoroughly explained to pt today in office with all questions answered.    "START TIMED VOIDING/BLADDER TRAINING" ASAP!!:  WHETHER YOU FEEL AN URGE TO URINATE OR NOT, YOU SHOULD BE FREQUENTING THE TOILET AND ATTEMPT TO URINATE EVERY 3 HOURS!!  NEVER POSTPONE URINATING OR HOLD YOUR URINE.    MAKE SURE YOU ARE HAVING REGULAR/NORMAL BOWEL MOVEMENTS AS THIS ALSO WILL HAVE AN EFFECT ON HOW YOUR BLADDER FUNCTIONS.    NOTHING TO EAT OR DRINK BY MOUTH (THIS INCLUDES WATER) AT LEAST 1-2 HOURS PRIOR TO YOUR BEDTIME ROUTINE.     F/u in six months or sooner to recheck LUTS.  Pt verbalized understanding.      Christal Kaur, FNP-C    "

## 2024-09-03 ENCOUNTER — LAB VISIT (OUTPATIENT)
Dept: LAB | Facility: HOSPITAL | Age: 59
End: 2024-09-03
Attending: NURSE PRACTITIONER
Payer: COMMERCIAL

## 2024-09-03 DIAGNOSIS — Z12.5 ENCOUNTER FOR PROSTATE CANCER SCREENING: ICD-10-CM

## 2024-09-03 PROCEDURE — 36415 COLL VENOUS BLD VENIPUNCTURE: CPT | Performed by: NURSE PRACTITIONER

## 2024-09-03 PROCEDURE — 84153 ASSAY OF PSA TOTAL: CPT | Performed by: NURSE PRACTITIONER

## 2024-09-03 PROCEDURE — 84154 ASSAY OF PSA FREE: CPT | Performed by: NURSE PRACTITIONER

## 2024-09-04 LAB
PROSTATE SPECIFIC ANTIGEN, TOTAL: 0.54 NG/ML (ref 0–4)
PSA FREE MFR SERPL: 29.63 %
PSA FREE SERPL-MCNC: 0.16 NG/ML (ref 0–1.5)

## 2024-09-24 ENCOUNTER — OFFICE VISIT (OUTPATIENT)
Dept: UROLOGY | Facility: CLINIC | Age: 59
End: 2024-09-24
Payer: COMMERCIAL

## 2024-09-24 DIAGNOSIS — N40.1 BENIGN PROSTATIC HYPERPLASIA WITH URINARY FREQUENCY: Primary | ICD-10-CM

## 2024-09-24 DIAGNOSIS — R35.0 BENIGN PROSTATIC HYPERPLASIA WITH URINARY FREQUENCY: Primary | ICD-10-CM

## 2024-09-24 PROCEDURE — 99214 OFFICE O/P EST MOD 30 MIN: CPT | Mod: S$GLB,,, | Performed by: NURSE PRACTITIONER

## 2024-09-24 PROCEDURE — 99999 PR PBB SHADOW E&M-EST. PATIENT-LVL II: CPT | Mod: PBBFAC,,, | Performed by: NURSE PRACTITIONER

## 2024-09-24 PROCEDURE — 1159F MED LIST DOCD IN RCRD: CPT | Mod: CPTII,S$GLB,, | Performed by: NURSE PRACTITIONER

## 2024-09-24 PROCEDURE — 1160F RVW MEDS BY RX/DR IN RCRD: CPT | Mod: CPTII,S$GLB,, | Performed by: NURSE PRACTITIONER

## 2024-09-24 NOTE — PROGRESS NOTES
"[facility] Note  Staff: CONSUELO Hart-C    PCP: DO Ruy    Chief Complaint: F/UP-LUTS    Subjective:        HPI: Marcelo Broderick is a 59 y.o. male presents today for evaluation of routine recheck of his prior LUTS.     HX:  The pt was initially evaluated in 2019 by me (CONSUELO Hart) for urinary hesitancy, frequency, and straining symptoms.  Pt has seen Dr.Stephen Rosa in the past ([id number]) for an finding of "asymmetric prostate" which was visualized pt states from GI MD who was doing his colonoscopy at that time and referred him to  for further evaluation.  No procedures were performed in regards to this as stated by pt in office today.     OV 2019:  Urgency: Sometimes, urge incontinence? None  NTF: 1x night, DTF: None  Dysuria: No  Gross Hematuria:No  Straining:Yes, Hesistancy:Yes, Intermittency:Yes, Weak stream:Yes  Vasectomy: , no complications with that procedure done in [place], [place]  No recent PSA lab work has been performed.     AUA SS: 15 with QOL at  "Pleased"  Feeling of ICBE: 1  Frequency:3  Intermittency:4  Urgency:1  Weak urine stream:4  Strainin  Nocturia:1    24:  Pt evaluated as a NP to re-establish for annual prostate exam/LUTS.  Pt arrives today with c/o increased frequency of urination and worsening weak stream.  Pt was started on Uroxatral 10 mg daily at 2019 office visit for his LUTS and was lost to f/up at that time.  UA in office--showed normal findings at this time.  PVR is 14 mL  No gross hematuria noted by pt today.  No dysuria at this time.  Pt states today he recently ingested something within the last day that is causing him to have diarrhea as of today.    24:  After last ov, pt was restarted on Alfuzosin 10 mg daily for his LUTS.  Pt does not like taking meds, he admits to taking med daily but will sometimes forget on occasions.  Pt states today his symptoms have minimally improved at this time regarding LUTS and would " like further evaluation at this time.    Recent PSA, Total and Free  9/3/24:  Total-0.54; 0.16-Free PSA; 29.63 Free %  PSA, Screen on 3/28/24 was 0.61    Pt is a Current Tobacco User-Cigarettes/1 pack per day.  Family Hx of  Cancers:  Maternal Uncle with prostate cancer.  Brother recently had prostate bx which resulted in benign findings.    REVIEW OF SYSTEMS:  A comprehensive 10 system review was performed and is negative except as noted above in HPI    PMHx:  Past Medical History:   Diagnosis Date    Prostate asymmetry      PSHx:  Past Surgical History:   Procedure Laterality Date    COLONOSCOPY  08/13/2019    Dr Bray   received report     KNEE ARTHROSCOPY W/ ACL RECONSTRUCTION Right     VASECTOMY       Allergies:  Pcn [penicillins]    Medications: reviewed     Objective:   There were no vitals filed for this visit.    General:WDWN in NAD  Eyes: PERRLA, normal conjunctiva  Respiratory: no increased work on breathing, clear to auscultation  Cardiovascular: regular rate and rhythm. No obvious extremity edema.  GI: palpation of masses. No tenderness. No hepatosplenomegaly to palpation.  Musculoskeletal: normal range of motion of bilateral upper extremities. Normal muscle strength and tone.  Skin: no obvious rashes or lesions. No tightening of skin noted.  Neurologic: CN grossly normal. Normal sensation.   Psychiatric: awake, alert and oriented x 3. Mood and affect normal. Cooperative.     Exam performed by me during OV today:  Inspection of anus and perineum normal  ERICA: 30g enlarged, smooth prostate gland without nodules, masses, tenderness. SV not palpable. Normal sphincter tone.   +Outer hemhorroids      Assessment:       1. Benign prostatic hyperplasia with urinary frequency          Plan:     Until pt can see MD, instructed him to continue Uroxatral 10 mg daily as previously prescribed.  Pt has hx of not taking meds daily or as prescribed.    F/u with Urologist in order to discuss further evaluation and  treatment of ongoing LUTS at this time.  May need Cysto/TRUS in future in order to discuss alternative options for treatment regarding his LUTS rather than taking daily meds per pt preference.    I have spent over 30 min with this patient regarding discussion of the following:    -Discussed conservative measures to control urgency and frequency including   1. Avoiding/minimizing bladder irritants (see below), especially in afternoon and evening hours     Discussed bladder irritants include coffee (even decaf), tea, alcohol, soda, spicy foods, acidic juices (orange, tomato), vinegar, and artificial sweeteners/sugary beverages.     2. Do Timed Voiding - empty on a schedule (approx ~2-3 hours) in spite of need to urinate, to get ahead of urge     3. Do not postponing voiding - dont hold it on purpose      4. Bowel regimen as distended bowel has extrinsic compressive effect on bladder.   - any or all of the following in any combination, titrate to soft daily bowel movement without pushing or straining  - colace/stool softener capsule - once to twice daily  - miralax - 1 capful daily to start, can increase to 2x daily (or decrease to 1/2 cap daily)  - increase dietary fibery  - fiber supplements, such as metamucil  - prunes, prune juice     5. INCREASE water intake     6. Stop fluids 2 hours before bed, and urinate just before bed        Christal Kaur, CONSUELO-C

## 2025-04-11 ENCOUNTER — OFFICE VISIT (OUTPATIENT)
Dept: ORTHOPEDICS | Facility: CLINIC | Age: 60
End: 2025-04-11
Payer: COMMERCIAL

## 2025-04-11 ENCOUNTER — HOSPITAL ENCOUNTER (OUTPATIENT)
Dept: RADIOLOGY | Facility: HOSPITAL | Age: 60
Discharge: HOME | End: 2025-04-11
Attending: PHYSICIAN ASSISTANT
Payer: COMMERCIAL

## 2025-04-11 VITALS — HEIGHT: 66 IN | BODY MASS INDEX: 22.18 KG/M2 | WEIGHT: 138 LBS

## 2025-04-11 DIAGNOSIS — M22.42 CHONDROMALACIA PATELLAE OF LEFT KNEE: Primary | ICD-10-CM

## 2025-04-11 DIAGNOSIS — S83.242A TEAR OF MEDIAL MENISCUS OF LEFT KNEE, UNSPECIFIED TEAR TYPE, UNSPECIFIED WHETHER OLD OR CURRENT TEAR, INITIAL ENCOUNTER: ICD-10-CM

## 2025-04-11 PROCEDURE — 73562 X-RAY EXAM OF KNEE 3: CPT | Mod: TC,PN,LT

## 2025-04-11 PROCEDURE — 99999 PR PBB SHADOW E&M-EST. PATIENT-LVL III: CPT | Mod: PBBFAC,,, | Performed by: PHYSICIAN ASSISTANT

## 2025-04-11 PROCEDURE — 73562 X-RAY EXAM OF KNEE 3: CPT | Mod: 26,LT,, | Performed by: RADIOLOGY

## 2025-04-11 RX ORDER — TRIAMCINOLONE ACETONIDE 40 MG/ML
40 INJECTION, SUSPENSION INTRA-ARTICULAR; INTRAMUSCULAR
Status: DISCONTINUED | OUTPATIENT
Start: 2025-04-11 | End: 2025-04-11 | Stop reason: HOSPADM

## 2025-04-11 RX ADMIN — TRIAMCINOLONE ACETONIDE 40 MG: 40 INJECTION, SUSPENSION INTRA-ARTICULAR; INTRAMUSCULAR at 08:04

## 2025-04-11 NOTE — PROCEDURES
Large Joint Aspiration/Injection: L knee    Date/Time: 4/11/2025 8:30 AM    Performed by: Shawn Delcid PA  Authorized by: Shawn Delcid PA    Consent Done?:  Yes (Verbal)  Indications:  Pain  Site marked: the procedure site was marked    Timeout: prior to procedure the correct patient, procedure, and site was verified    Prep: patient was prepped and draped in usual sterile fashion      Local anesthesia used?: Yes    Local anesthetic:  Lidocaine 1% without epinephrine    Details:  Needle Size:  25 G  Ultrasonic Guidance for needle placement?: No    Approach:  Anterolateral  Location:  Knee  Site:  L knee  Medications:  40 mg triamcinolone acetonide 40 mg/mL  Patient tolerance:  Patient tolerated the procedure well with no immediate complications

## 2025-04-11 NOTE — PROGRESS NOTES
[facility] ORTHOPEDICS  [address]  [place] [address]  Phone: [phone]   [phone]    Patient's PCP: Hannah Redmond DO  Referring Provider: Aaarefhong Self    Subjective:      Chief Complaint:   Chief Complaint   Patient presents with    Left Knee - Pain     L knee pain x 2-3 weeks, may have injured it while bowling. Some tenderness to the touch. Pain is present while walking/standing and sitting. Endorses popping, feels better after it has popped. Minimal relief with Advil. Has been wearing a brace. Pain is present inferiorly to knee, medially. Denies swelling. More painful when straightening leg.       Past Medical History:   Diagnosis Date    Prostate asymmetry        Past Surgical History:   Procedure Laterality Date    COLONOSCOPY  08/13/2019    Dr Bray   received report     KNEE ARTHROSCOPY W/ ACL RECONSTRUCTION Right     VASECTOMY         Current Outpatient Medications   Medication Sig    alfuzosin (UROXATRAL) 10 mg Tb24 Take 1 tablet (10 mg total) by mouth after dinner.    albuterol (PROAIR HFA) 90 mcg/actuation inhaler Inhale 2 puffs into the lungs every 4 (four) hours as needed for Shortness of Breath. Rescue    budesonide-formoterol 160-4.5 mcg (SYMBICORT) 160-4.5 mcg/actuation HFAA Inhale 2 puffs into the lungs 2 (two) times daily. Controller     No current facility-administered medications for this visit.       Review of patient's allergies indicates:   Allergen Reactions    Pcn [penicillins]        Family History   Problem Relation Name Age of Onset    Cancer Mother          colon    Hypertension Mother      Heart disease Mother      Atrial fibrillation Mother      Emphysema Father      COPD Father      Hyperlipidemia Brother         Social History[1]    Prior to meeting with the patient I reviewed the medical chart in [facility]. This included reviewing the previous progress notes from our office, review of the patient's last appointment with their primary care  provider, review of any visits to the emergency room, and review of any pain management appointments or procedures.    History of present illness: 59 y.o. male,  presents to clinic today for evaluation of complaints of left knee pain.  He has had acute on chronic left knee pain for years.  He denies any injury, he bowls quite often but he does not remember any specific twisting or movement injury.  Tender over the medial aspect of the left knee.       Review of Systems:    Constitutional: Negative for chills, fever and weight loss.   HENT: Negative for congestion.    Eyes: Negative for discharge and redness.   Respiratory: Negative for cough and shortness of breath.    Cardiovascular: Negative for chest pain.   Gastrointestinal: Negative for nausea and vomiting.   Musculoskeletal: See HPI.   Skin: Negative for rash.   Neurological: Negative for headaches.   Endo/Heme/Allergies: Does not bruise/bleed easily.   Psychiatric/Behavioral: The patient is not nervous/anxious.    All other systems reviewed and are negative.       Objective:      Physical Examination:    Vital Signs:  There were no vitals filed for this visit.    Body mass index is 22.28 kg/m².    This a well-developed, well nourished patient in no acute distress.  They are alert and oriented and cooperative to examination.     Examination of the left knee, skin is dry and intact, no erythema or ecchymosis, no signs symptoms of infection.  Range of motion 0 to 120°.  Stable to anterior-posterior varus and valgus stress.  Homans signs negative, straight leg raise is negative.  Distally neurovascularly intact.      Pertinent New Results:        XRAY Report / Interpretation:   AP lateral sunrise views of the left knee taken today in the office demonstrate some mild joint space narrowing primarily in the medial compartments bilaterally.  Visualized soft tissues are unremarkable.    Procedure/s:  Large Joint Aspiration/Injection: L knee    Date/Time: 4/11/2025  8:30 AM    Performed by: Shawn Delcid PA  Authorized by: Shawn Delcid PA    Consent Done?:  Yes (Verbal)  Indications:  Pain  Site marked: the procedure site was marked    Timeout: prior to procedure the correct patient, procedure, and site was verified    Prep: patient was prepped and draped in usual sterile fashion      Local anesthesia used?: Yes    Local anesthetic:  Lidocaine 1% without epinephrine    Details:  Needle Size:  25 G  Ultrasonic Guidance for needle placement?: No    Approach:  Anterolateral  Location:  Knee  Site:  L knee  Medications:  40 mg triamcinolone acetonide 40 mg/mL  Patient tolerance:  Patient tolerated the procedure well with no immediate complications           Assessment:       1. Chondromalacia patellae of left knee    2. Tear of medial meniscus of left knee, unspecified tear type, unspecified whether old or current tear, initial encounter      Plan:     Chondromalacia patellae of left knee  -     X-Ray Knee 3 View Left  -     Large Joint Aspiration/Injection: L knee    Tear of medial meniscus of left knee, unspecified tear type, unspecified whether old or current tear, initial encounter        Follow up for 4-6 week f/u - left knee, injected 4/11/25.    Left knee pain, x-rays without advanced arthritic change.  I suspect he has a degenerative meniscal tear.  We injected the left knee today we will follow him up in 6 weeks.  If he is doing well he can simply cancel and follow up as needed.  If symptoms persist we will proceed with advanced imaging, MRI left knee.    The patient and I had a thorough discussion today.  We discussed the working diagnosis as well as several other potential alternative diagnoses.  We discussed treatment options, both conservative and surgical.  Conservative treatment options would include things such as activity modifications, workplace modifications, a period of rest, oral versus topical over the counter and oral versus topical  prescription anti-inflammatory medications, physical therapy / occupational therapy, splinting / bracing, immobilization, corticosteroid injections, and others.        DAVID Griggs PA-C        EMR Statement:  Please note that portions of this patient encounter record were imported from the patients electronic medical record and that others were dictated using voice recognition software. For these reasons grammatical errors, nonsensical language, and apparently contradictory statements may be present.  These should be disregarded or interpreted with respect to the context of the document.       [1]   Social History  Socioeconomic History    Marital status:    Tobacco Use    Smoking status: Every Day     Current packs/day: 1.00     Average packs/day: 1 pack/day for 41.3 years (41.3 ttl pk-yrs)     Types: Cigarettes     Start date: 1984    Smokeless tobacco: Never   Substance and Sexual Activity    Alcohol use: Yes     Alcohol/week: 28.0 standard drinks of alcohol     Types: 28 Cans of beer per week    Drug use: No    Sexual activity: Yes     Social Drivers of Health     Financial Resource Strain: Low Risk  (4/19/2024)    Overall Financial Resource Strain (CARDIA)     Difficulty of Paying Living Expenses: Not hard at all   Food Insecurity: No Food Insecurity (4/19/2024)    Hunger Vital Sign     Worried About Running Out of Food in the Last Year: Never true     Ran Out of Food in the Last Year: Never true   Transportation Needs: No Transportation Needs (4/19/2024)    PRAPARE - Transportation     Lack of Transportation (Medical): No     Lack of Transportation (Non-Medical): No   Physical Activity: Sufficiently Active (4/19/2024)    Exercise Vital Sign     Days of Exercise per Week: 5 days     Minutes of Exercise per Session: 150+ min   Stress: No Stress Concern Present (4/19/2024)    [employer] - Occupational Stress Questionnaire     Feeling of Stress : Not at all   Housing  Stability: Unknown (4/19/2024)    Housing Stability Vital Sign     Unable to Pay for Housing in the Last Year: No

## 2025-07-25 ENCOUNTER — OFFICE VISIT (OUTPATIENT)
Dept: ORTHOPEDICS | Facility: CLINIC | Age: 60
End: 2025-07-25
Payer: COMMERCIAL

## 2025-07-25 VITALS — WEIGHT: 138 LBS | BODY MASS INDEX: 22.18 KG/M2 | HEIGHT: 66 IN

## 2025-07-25 DIAGNOSIS — M22.42 CHONDROMALACIA PATELLAE OF LEFT KNEE: Primary | ICD-10-CM

## 2025-07-25 PROCEDURE — 99999 PR PBB SHADOW E&M-EST. PATIENT-LVL III: CPT | Mod: PBBFAC,,,

## 2025-07-25 RX ORDER — TRIAMCINOLONE ACETONIDE 40 MG/ML
40 INJECTION, SUSPENSION INTRA-ARTICULAR; INTRAMUSCULAR
Status: DISCONTINUED | OUTPATIENT
Start: 2025-07-25 | End: 2025-07-25 | Stop reason: HOSPADM

## 2025-07-25 RX ADMIN — TRIAMCINOLONE ACETONIDE 40 MG: 40 INJECTION, SUSPENSION INTRA-ARTICULAR; INTRAMUSCULAR at 09:07

## 2025-07-25 NOTE — PROCEDURES
Large Joint Aspiration/Injection: L knee    Date/Time: 7/25/2025 9:00 AM    Performed by: Rios Bravo PA-C  Authorized by: Rios Bravo PA-C    Timeout: prior to procedure the correct patient, procedure, and site was verified    Prep: patient was prepped and draped in usual sterile fashion      Local anesthesia used?: Yes    Local anesthetic:  Lidocaine 1% without epinephrine    Details:  Needle Size:  22 G  Ultrasonic Guidance for needle placement?: No    Approach:  Anterolateral  Location:  Knee  Site:  L knee  Medications:  40 mg triamcinolone acetonide 40 mg/mL  Patient tolerance:  Patient tolerated the procedure well with no immediate complications

## 2025-07-25 NOTE — PROGRESS NOTES
[facility] ORTHOPEDICS  [address]  [place] [address]  Phone: [phone]   [phone]    Patient's PCP: Hannah Redmond DO  Referring Provider: No ref. provider found    Subjective:     Chief Complaint:   Chief Complaint   Patient presents with    Left Knee - Pain     L knee pain, inj 4.11.25. Today, having pain located behind the knee and radiates up his thigh and down his calf posteriorly. States pain has returned x 2 days. Has hx blood clot R leg, pain feels similar to that. Had good relief with injection. Would like repeat injection today.        Past Medical History:   Diagnosis Date    Prostate asymmetry        Past Surgical History:   Procedure Laterality Date    COLONOSCOPY  08/13/2019    Dr Bray   received report     KNEE ARTHROSCOPY W/ ACL RECONSTRUCTION Right     VASECTOMY         Current Outpatient Medications   Medication Sig    alfuzosin (UROXATRAL) 10 mg Tb24 Take 1 tablet (10 mg total) by mouth after dinner.    albuterol (PROAIR HFA) 90 mcg/actuation inhaler Inhale 2 puffs into the lungs every 4 (four) hours as needed for Shortness of Breath. Rescue (Patient not taking: Reported on 7/25/2025)    budesonide-formoterol 160-4.5 mcg (SYMBICORT) 160-4.5 mcg/actuation HFAA Inhale 2 puffs into the lungs 2 (two) times daily. Controller (Patient not taking: Reported on 7/25/2025)     No current facility-administered medications for this visit.       Review of patient's allergies indicates:   Allergen Reactions    Pcn [penicillins]        Family History   Problem Relation Name Age of Onset    Cancer Mother          colon    Hypertension Mother      Heart disease Mother      Atrial fibrillation Mother      Emphysema Father      COPD Father      Hyperlipidemia Brother         Social History[1]    Prior to meeting with the patient I reviewed the medical chart in [facility]. This included reviewing the previous progress notes from our office, review of the patient's last appointment with  their primary care provider, review of any visits to the emergency room, and review of any pain management appointments or procedures.    History of present illness: 59 y.o. male  presenting with chronic left knee pain that has been present for several years.  He has known chondromalacia patella of the left knee.  He has received an intra-articular corticosteroid injection in the past for his left knee pain with complete relief of his symptoms. He states that his pain is intermittent and increases in severity with prolonged ambulation and increased activity.  He states that he went bowling a few days ago which may have contributed to the onset of his left knee pain.  He localizes his left knee pain to the posterior aspect of his left knee with intermittent tightness along his left hamstring and left calf.  He denies any numbness or tingling throughout his left lower extremity.      Review of Systems:    Constitutional: Negative for chills, fever and weight loss.  HENT: Negative for congestion.    Eyes: Negative for discharge and redness.  Respiratory: Negative for cough and shortness of breath.    Cardiovascular: Negative for chest pain.  Gastrointestinal: Negative for nausea and vomiting.  Musculoskeletal: See HPI.  Skin: Negative for rash.  Neurological: Negative for headaches.  Endo/Heme/Allergies: Does not bruise/bleed easily.  Psychiatric/Behavioral: The patient is not nervous/anxious.    All other systems reviewed and are negative.       Objective:     Physical Examination:    Vital Signs: VITALS@    Body mass index is 22.28 kg/m².    This a well-developed, well nourished patient in no acute distress.  They are alert and oriented and cooperative to examination.    Left knee exam:  Skin overlying the knee is clean dry and intact.  No erythema or ecchymosis.  No signs or symptoms of infection.  Crepitus upon flexion-extension.  Range of motion 0-120 degrees.  Stable varus valgus stress.  Negative  anterior-posterior drawer test.  Negative Homans.  Negative straight leg raise test.  Distally neurovascularly intact.  He is weight-bearing on his left lower extremity and ambulates without an assistive device.      Pertinent New Results:      XRAY Report / Interpretation:   No new radiographs taken at today's visit    Procedure/s:  Large Joint Aspiration/Injection: L knee    Date/Time: 7/25/2025 9:00 AM    Performed by: Rios Bravo PA-C  Authorized by: Rios Bravo PA-C    Timeout: prior to procedure the correct patient, procedure, and site was verified    Prep: patient was prepped and draped in usual sterile fashion      Local anesthesia used?: Yes    Local anesthetic:  Lidocaine 1% without epinephrine    Details:  Needle Size:  22 G  Ultrasonic Guidance for needle placement?: No    Approach:  Anterolateral  Location:  Knee  Site:  L knee  Medications:  40 mg triamcinolone acetonide 40 mg/mL  Patient tolerance:  Patient tolerated the procedure well with no immediate complications         Assessment:     1. Chondromalacia patellae of left knee      Plan:    Chondromalacia patellae of left knee  -     Large Joint Aspiration/Injection: L knee        Follow up in about 6 weeks (around 9/5/2025) for L knee inj 7/25/25.    Chondromalacia patella left knee.  He has responded well in the past to intra-articular corticosteroid injections of the left knee most recently being administered about 3 months ago on April 11, 2025.  He states that his left knee pain has returned over the past 2 days.  I injected his left knee again today anterolateral approach with corticosteroid 40 mg Kenalog.  He tolerated this well.  I encouraged him to take over-the-counter Tylenol or Advil as needed for his left knee inflammatory symptoms.  I would like him to follow up in 6 weeks to reassess his left knee pain.    Receiving a steroid injection is a simple, in-office procedure.     After your injection, keep the following in mind:      In the first 48 hours after a steroid injection...     -You should be able to resume your normal day-to-day activities     -If you have any mild pain or swelling at the injection site, place an ice pack on the injection site for 10 minutes or as recommended by your doctor     In the months after an injection...     -Everyone responds differently so when your pain returns, schedule a follow up appointment     Corticosteroid injections are generally safe but do carry potential risks.  Local side effects may include, but is not limited to: postinjection flare, skin hypopigmentation and atrophy, infection, tendon rupture, accelerated progression of osteoarthritis, and osseous injury. Systemic side effects may include, but is not limited to: facial flushing, hypertension, and hyperglycemia.    The patient and I had a thorough discussion today.  We discussed the working diagnosis as well as several other potential alternative diagnoses.  We discussed treatment options, both conservative and surgical.  Conservative treatment options would include things such as activity modifications, workplace modifications, a period of rest, oral versus topical over the counter and oral versus topical prescription anti-inflammatory medications, physical therapy / occupational therapy, splinting / bracing, immobilization, corticosteroid injections, and others.      Rios Bravo PA-C      EMR Statement:  Please note that portions of this patient encounter record were imported from the patients electronic medical record and that others were dictated using voice recognition software. For these reasons grammatical errors, nonsensical language, and apparently contradictory statements may be present.  These should be disregarded or interpreted with respect to the context of the document.       [1]   Social History  Socioeconomic History    Marital status:    Tobacco Use    Smoking status: Every Day     Current packs/day: 1.00      Average packs/day: 1 pack/day for 41.6 years (41.6 ttl pk-yrs)     Types: Cigarettes     Start date: 1984    Smokeless tobacco: Never   Substance and Sexual Activity    Alcohol use: Yes     Alcohol/week: 28.0 standard drinks of alcohol     Types: 28 Cans of beer per week    Drug use: No    Sexual activity: Yes     Social Drivers of Health     Financial Resource Strain: Low Risk  (4/19/2024)    Overall Financial Resource Strain (CARDIA)     Difficulty of Paying Living Expenses: Not hard at all   Food Insecurity: No Food Insecurity (4/19/2024)    Hunger Vital Sign     Worried About Running Out of Food in the Last Year: Never true     Ran Out of Food in the Last Year: Never true   Transportation Needs: No Transportation Needs (4/19/2024)    PRAPARE - Transportation     Lack of Transportation (Medical): No     Lack of Transportation (Non-Medical): No   Physical Activity: Sufficiently Active (4/19/2024)    Exercise Vital Sign     Days of Exercise per Week: 5 days     Minutes of Exercise per Session: 150+ min   Stress: No Stress Concern Present (4/19/2024)    [employer] - Occupational Stress Questionnaire     Feeling of Stress : Not at all   Housing Stability: Unknown (4/19/2024)    Housing Stability Vital Sign     Unable to Pay for Housing in the Last Year: No

## 2025-08-19 ENCOUNTER — OFFICE VISIT (OUTPATIENT)
Dept: UROLOGY | Facility: CLINIC | Age: 60
End: 2025-08-19
Payer: COMMERCIAL

## 2025-08-19 VITALS — HEIGHT: 67 IN | BODY MASS INDEX: 22.29 KG/M2 | WEIGHT: 142 LBS

## 2025-08-19 DIAGNOSIS — Z12.5 ENCOUNTER FOR PROSTATE CANCER SCREENING: ICD-10-CM

## 2025-08-19 DIAGNOSIS — R35.0 BENIGN PROSTATIC HYPERPLASIA WITH URINARY FREQUENCY: Primary | ICD-10-CM

## 2025-08-19 DIAGNOSIS — N40.1 BENIGN PROSTATIC HYPERPLASIA WITH URINARY FREQUENCY: Primary | ICD-10-CM

## 2025-08-19 PROCEDURE — 99999 PR PBB SHADOW E&M-EST. PATIENT-LVL III: CPT | Mod: PBBFAC,,, | Performed by: NURSE PRACTITIONER

## 2025-08-19 PROCEDURE — 3008F BODY MASS INDEX DOCD: CPT | Mod: CPTII,S$GLB,, | Performed by: NURSE PRACTITIONER

## 2025-08-19 PROCEDURE — 1159F MED LIST DOCD IN RCRD: CPT | Mod: CPTII,S$GLB,, | Performed by: NURSE PRACTITIONER

## 2025-08-19 PROCEDURE — 99213 OFFICE O/P EST LOW 20 MIN: CPT | Mod: S$GLB,,, | Performed by: NURSE PRACTITIONER

## 2025-08-19 PROCEDURE — 1160F RVW MEDS BY RX/DR IN RCRD: CPT | Mod: CPTII,S$GLB,, | Performed by: NURSE PRACTITIONER

## 2025-08-19 RX ORDER — ALFUZOSIN HYDROCHLORIDE 10 MG/1
10 TABLET, EXTENDED RELEASE ORAL
Qty: 90 TABLET | Refills: 11 | Status: SHIPPED | OUTPATIENT
Start: 2025-08-19 | End: 2025-11-17